# Patient Record
Sex: MALE | Race: WHITE
[De-identification: names, ages, dates, MRNs, and addresses within clinical notes are randomized per-mention and may not be internally consistent; named-entity substitution may affect disease eponyms.]

---

## 2017-01-13 ENCOUNTER — HOSPITAL ENCOUNTER (OUTPATIENT)
Dept: HOSPITAL 47 - LABPAT | Age: 72
Discharge: HOME | End: 2017-01-13
Payer: MEDICARE

## 2017-01-13 ENCOUNTER — HOSPITAL ENCOUNTER (OUTPATIENT)
Dept: HOSPITAL 47 - LABWHC1 | Age: 72
Discharge: HOME | End: 2017-01-13
Payer: MEDICARE

## 2017-01-13 DIAGNOSIS — E11.9: ICD-10-CM

## 2017-01-13 DIAGNOSIS — E11.65: Primary | ICD-10-CM

## 2017-01-13 DIAGNOSIS — R35.0: ICD-10-CM

## 2017-01-13 DIAGNOSIS — I10: ICD-10-CM

## 2017-01-13 DIAGNOSIS — N13.8: ICD-10-CM

## 2017-01-13 DIAGNOSIS — N40.3: ICD-10-CM

## 2017-01-13 DIAGNOSIS — Z01.810: Primary | ICD-10-CM

## 2017-01-13 LAB
ALP SERPL-CCNC: 66 U/L (ref 38–126)
ALT SERPL-CCNC: 48 U/L (ref 21–72)
ANION GAP SERPL CALC-SCNC: 15 MMOL/L
AST SERPL-CCNC: 37 U/L (ref 17–59)
BASOPHILS # BLD AUTO: 0 K/UL (ref 0–0.2)
BASOPHILS NFR BLD AUTO: 0 %
BUN SERPL-SCNC: 25 MG/DL (ref 9–20)
CALCIUM SPEC-MCNC: 9.6 MG/DL (ref 8.4–10.2)
CH: 29.3
CHCM: 33.5
CHLORIDE SERPL-SCNC: 97 MMOL/L (ref 98–107)
CO2 SERPL-SCNC: 25 MMOL/L (ref 22–30)
EOSINOPHIL # BLD AUTO: 0.1 K/UL (ref 0–0.7)
EOSINOPHIL NFR BLD AUTO: 2 %
ERYTHROCYTE [DISTWIDTH] IN BLOOD BY AUTOMATED COUNT: 4.03 M/UL (ref 4.3–5.9)
ERYTHROCYTE [DISTWIDTH] IN BLOOD: 13.5 % (ref 11.5–15.5)
GLUCOSE SERPL-MCNC: 197 MG/DL (ref 74–99)
HCT VFR BLD AUTO: 35.5 % (ref 39–53)
HDW: 2.96
HGB BLD-MCNC: 11.8 GM/DL (ref 13–17.5)
LUC NFR BLD AUTO: 3 %
LYMPHOCYTES # SPEC AUTO: 0.9 K/UL (ref 1–4.8)
LYMPHOCYTES NFR SPEC AUTO: 14 %
MCH RBC QN AUTO: 29.3 PG (ref 25–35)
MCHC RBC AUTO-ENTMCNC: 33.3 G/DL (ref 31–37)
MCV RBC AUTO: 88 FL (ref 80–100)
MONOCYTES # BLD AUTO: 0.3 K/UL (ref 0–1)
MONOCYTES NFR BLD AUTO: 4 %
NEUTROPHILS # BLD AUTO: 4.6 K/UL (ref 1.3–7.7)
NEUTROPHILS NFR BLD AUTO: 77 %
NON-AFRICAN AMERICAN GFR(MDRD): >60
POTASSIUM SERPL-SCNC: 4.5 MMOL/L (ref 3.5–5.1)
PROT SERPL-MCNC: 6.9 G/DL (ref 6.3–8.2)
SODIUM SERPL-SCNC: 137 MMOL/L (ref 137–145)
WBC # BLD AUTO: 0.16 10*3/UL
WBC # BLD AUTO: 5.9 K/UL (ref 3.8–10.6)
WBC (PEROX): 6.42

## 2017-01-13 PROCEDURE — 84443 ASSAY THYROID STIM HORMONE: CPT

## 2017-01-13 PROCEDURE — 80053 COMPREHEN METABOLIC PANEL: CPT

## 2017-01-13 PROCEDURE — 93005 ELECTROCARDIOGRAM TRACING: CPT

## 2017-01-13 PROCEDURE — 87086 URINE CULTURE/COLONY COUNT: CPT

## 2017-01-13 PROCEDURE — 87186 SC STD MICRODIL/AGAR DIL: CPT

## 2017-01-13 PROCEDURE — 85025 COMPLETE CBC W/AUTO DIFF WBC: CPT

## 2017-01-13 PROCEDURE — 36415 COLL VENOUS BLD VENIPUNCTURE: CPT

## 2017-01-13 PROCEDURE — 84439 ASSAY OF FREE THYROXINE: CPT

## 2017-01-13 PROCEDURE — 87077 CULTURE AEROBIC IDENTIFY: CPT

## 2017-01-19 ENCOUNTER — HOSPITAL ENCOUNTER (OUTPATIENT)
Dept: HOSPITAL 47 - OR | Age: 72
LOS: 1 days | Discharge: HOME | End: 2017-01-20
Payer: MEDICARE

## 2017-01-19 VITALS — BODY MASS INDEX: 35.2 KG/M2

## 2017-01-19 DIAGNOSIS — I25.2: ICD-10-CM

## 2017-01-19 DIAGNOSIS — Z82.49: ICD-10-CM

## 2017-01-19 DIAGNOSIS — E78.00: ICD-10-CM

## 2017-01-19 DIAGNOSIS — R53.1: ICD-10-CM

## 2017-01-19 DIAGNOSIS — Z88.8: ICD-10-CM

## 2017-01-19 DIAGNOSIS — N13.2: ICD-10-CM

## 2017-01-19 DIAGNOSIS — S81.801S: ICD-10-CM

## 2017-01-19 DIAGNOSIS — Z87.891: ICD-10-CM

## 2017-01-19 DIAGNOSIS — Z83.3: ICD-10-CM

## 2017-01-19 DIAGNOSIS — Z88.1: ICD-10-CM

## 2017-01-19 DIAGNOSIS — Z79.4: ICD-10-CM

## 2017-01-19 DIAGNOSIS — K21.9: ICD-10-CM

## 2017-01-19 DIAGNOSIS — Z79.84: ICD-10-CM

## 2017-01-19 DIAGNOSIS — E11.9: ICD-10-CM

## 2017-01-19 DIAGNOSIS — I11.0: ICD-10-CM

## 2017-01-19 DIAGNOSIS — Z79.899: ICD-10-CM

## 2017-01-19 DIAGNOSIS — R33.8: ICD-10-CM

## 2017-01-19 DIAGNOSIS — Z79.82: ICD-10-CM

## 2017-01-19 DIAGNOSIS — M19.90: ICD-10-CM

## 2017-01-19 DIAGNOSIS — Z88.5: ICD-10-CM

## 2017-01-19 DIAGNOSIS — C61: Primary | ICD-10-CM

## 2017-01-19 DIAGNOSIS — Z86.73: ICD-10-CM

## 2017-01-19 DIAGNOSIS — I50.9: ICD-10-CM

## 2017-01-19 DIAGNOSIS — G47.33: ICD-10-CM

## 2017-01-19 LAB
GLUCOSE BLD-MCNC: 175 MG/DL (ref 75–99)
GLUCOSE BLD-MCNC: 183 MG/DL (ref 75–99)
GLUCOSE BLD-MCNC: 188 MG/DL (ref 75–99)
GLUCOSE BLD-MCNC: 194 MG/DL (ref 75–99)
GLUCOSE BLD-MCNC: 255 MG/DL (ref 75–99)
HEMOGLOBIN A1C: 6.6 % (ref 4.2–6.1)

## 2017-01-19 PROCEDURE — 88305 TISSUE EXAM BY PATHOLOGIST: CPT

## 2017-01-19 PROCEDURE — 83036 HEMOGLOBIN GLYCOSYLATED A1C: CPT

## 2017-01-19 PROCEDURE — 52601 PROSTATECTOMY (TURP): CPT

## 2017-01-19 PROCEDURE — 88341 IMHCHEM/IMCYTCHM EA ADD ANTB: CPT

## 2017-01-19 PROCEDURE — 88342 IMHCHEM/IMCYTCHM 1ST ANTB: CPT

## 2017-01-19 RX ADMIN — DOCUSATE SODIUM SCH MG: 100 CAPSULE, LIQUID FILLED ORAL at 22:10

## 2017-01-19 RX ADMIN — CEFAZOLIN SCH MLS/HR: 330 INJECTION, POWDER, FOR SOLUTION INTRAMUSCULAR; INTRAVENOUS at 19:23

## 2017-01-19 RX ADMIN — INSULIN LISPRO SCH UNIT: 100 INJECTION, SOLUTION INTRAVENOUS; SUBCUTANEOUS at 17:53

## 2017-01-19 RX ADMIN — CEFAZOLIN SCH MLS: 330 INJECTION, POWDER, FOR SOLUTION INTRAMUSCULAR; INTRAVENOUS at 10:00

## 2017-01-19 RX ADMIN — OXYBUTYNIN CHLORIDE SCH MG: 5 TABLET ORAL at 22:10

## 2017-01-19 RX ADMIN — CEFAZOLIN SCH MLS: 330 INJECTION, POWDER, FOR SOLUTION INTRAMUSCULAR; INTRAVENOUS at 11:27

## 2017-01-19 RX ADMIN — INSULIN LISPRO SCH UNIT: 100 INJECTION, SOLUTION INTRAVENOUS; SUBCUTANEOUS at 13:19

## 2017-01-19 RX ADMIN — CEFAZOLIN SCH MLS/HR: 330 INJECTION, POWDER, FOR SOLUTION INTRAMUSCULAR; INTRAVENOUS at 14:47

## 2017-01-19 NOTE — P.OP
Date of Procedure: 01/19/17


Preoperative Diagnosis: 


BPH with Obstruction, Urinary Retention


Postoperative Diagnosis: 


Same


Procedure(s) Performed: 


Cystoscopy, Bipolar Transurethral Resection of Prostate (TURP)


Anesthesia: spinal


Surgeon: Marvin Cuevas


Estimated Blood Loss (ml): 50


IV fluids (ml): 950


Pathology: other (prostate chips)


Condition: stable


Disposition: PACU


Indications for Procedure: 


He is a 71 year-old male with progressive urge incontinence despite taking 

Hytrin and oxybutynin chloride. Cystoscopy revealed a high median prostatic bar

, and urodynamic studies show excellent bladder function with detrusor 

pressures of 92 cm H20. He will thus undergo a TURP, as he has failed medical 

therapy.


Operative Findings: 


Complete prostatic obstruction with bilobar configuration and high median bar.


Description of Procedure: 


The patient was taken in the operating room and placed in the dorsolithotomy 

position after being given a spinal anesthetic,  The external genitalia was 

prepped and draped sterilely.  The 25-Mohawk ACMI resectoscope sheath was 

introduced into the bladder.  The bladder was inspected.  Both ureteral 

orifices were of normal anatomic location and configuration, and clear urine 

effluxed from both.  No tumors or foreign bodies were seen.  There was evidence 

of catheter cystitis.  Examination of the prostate revealed complete 

obstruction with a bilobar configuration and a high median bar.  Using the 

bipolar cutting loop, the prostatic floor was resected from the vesical neck up 

to the verumontanum.  the lateral lobes were resected down to the surgical 

capsule.  The resection of the floor floor of the prostate was then completed, 

proximal to the verumontanum.  Lastly, any remaining anterior tissue was 

resected.  Some remaining apical tissue was carefully resected, though this was 

likely incomplete given that the apex extended well beyond the verumontanum and 

care was taken not to extend the resection all the way to the external urinary 

sphincter.  The resection was carried down to the surgical capsule in all 4 

quadrants.   The prostatic fossa was then carefully examined, and any areas of 

bleeding were controlled with electrocautery.  Excellent hemostasis was 

attained.  The resectoscope was withdrawn into the bulbous urethra.  The 

external urinary sphincter remained intact.  The prostatic fossa was open.  The 

Shopalytic evacuator was used to remove all prostate chips from the bladder.  These 

were saved and sent for pathologic examination.  The resectoscope was removed, 

and a 22 [place]awk, 3-Way Granados catheter was placed.  The return was initially 

clear, but became bloody and thus continuous bladder irrigation was started 

using 0.9 normal saline.   The return was essentially clear.  The patient 

tolerated the procedure well was taken to the recovery room in stable condition.

## 2017-01-20 VITALS — RESPIRATION RATE: 16 BRPM

## 2017-01-20 VITALS — TEMPERATURE: 98.6 F | SYSTOLIC BLOOD PRESSURE: 147 MMHG | HEART RATE: 75 BPM | DIASTOLIC BLOOD PRESSURE: 65 MMHG

## 2017-01-20 LAB
GLUCOSE BLD-MCNC: 116 MG/DL (ref 75–99)
GLUCOSE BLD-MCNC: 183 MG/DL (ref 75–99)

## 2017-01-20 RX ADMIN — DOCUSATE SODIUM SCH MG: 100 CAPSULE, LIQUID FILLED ORAL at 08:20

## 2017-01-20 RX ADMIN — INSULIN LISPRO SCH UNIT: 100 INJECTION, SOLUTION INTRAVENOUS; SUBCUTANEOUS at 12:32

## 2017-01-20 RX ADMIN — OXYBUTYNIN CHLORIDE SCH MG: 5 TABLET ORAL at 08:20

## 2017-01-20 RX ADMIN — INSULIN LISPRO SCH: 100 INJECTION, SOLUTION INTRAVENOUS; SUBCUTANEOUS at 09:04

## 2017-01-20 RX ADMIN — INSULIN LISPRO SCH UNIT: 100 INJECTION, SOLUTION INTRAVENOUS; SUBCUTANEOUS at 08:20

## 2017-01-20 NOTE — P.DS
Providers


Expected date of discharge: 01/20/17


Attending physician: 


Marvin Cuevas





Primary care physician: 


Abbi Stahl





Hospital Course: 





On the day of admission, the patient underwent an uncomplicated TURP.  The 

postoperative course was unremarkable.  He remained afebrile with stable vital 

signs.  On the first postoperative day, the Granados catheter was draining clear 

yellow urine.


Procedures: 





Cystoscopy, bipolar transurethral resection of prostate in 01/19/2017.


Patient Condition at Discharge: Good





Plan - Discharge Summary


Discharge Medication List





Furosemide [Lasix] 40 mg PO DAILY 10/05/16 [History]


Insulin Aspart [NovoLOG Flexpen] 14 unit SQ AC-BID 10/05/16 [History]


Insulin Aspart [NovoLOG Flexpen] 16 unit SQ AC-SUPPER 10/05/16 [History]


Insulin Glargine [Lantus] 34 unit SQ QAM 10/05/16 [History]


Insulin Glargine [Lantus] 56 unit SQ HS 10/05/16 [History]


Multivitamins, Thera [Multivitamin] 1 tab PO DAILY 10/05/16 [History]


RX: Aspirin [Adult Low Dose Aspirin EC] 81 mg PO DAILY 10/05/16 [History]


RX: Lisinopril [Zestril] 2.5 mg PO DAILY 10/05/16 [History]


RX: Metolazone [Zaroxolyn] 5 mg PO TUSA 10/05/16 [History]


RX: Omeprazole 20 mg PO DAILY 10/05/16 [History]


RX: Oxybutynin Chloride [Ditropan] 5 mg PO TID 10/05/16 [History]


RX: Simvastatin [Zocor] 40 mg PO HS 10/05/16 [History]


RX: metFORMIN HCL 1,000 mg PO BID 10/05/16 [History]


Ascorbic Acid [Vitamin C] 500 mg PO DAILY 01/12/17 [History]


Ferrous Sulfate [Feosol] 325 mg PO DAILY 01/12/17 [History]








Follow up Appointment(s)/Referral(s): 


Marvin Cuevas MD [STAFF PHYSICIAN] - 1 Week


Activity/Diet/Wound Care/Special Instructions: 


Discharge home with Granados catheter.  Resume preoperative home medications.  My 

office will contact patient regarding follow-up.


Discharge Disposition: HOME SELF-CARE

## 2017-02-07 ENCOUNTER — HOSPITAL ENCOUNTER (OUTPATIENT)
Dept: HOSPITAL 47 - RADCTMAIN | Age: 72
Discharge: HOME | End: 2017-02-07
Payer: MEDICARE

## 2017-02-07 DIAGNOSIS — K80.20: ICD-10-CM

## 2017-02-07 DIAGNOSIS — N13.30: ICD-10-CM

## 2017-02-07 DIAGNOSIS — R59.0: ICD-10-CM

## 2017-02-07 DIAGNOSIS — N32.89: ICD-10-CM

## 2017-02-07 DIAGNOSIS — C61: Primary | ICD-10-CM

## 2017-02-07 LAB
BUN SERPL-SCNC: 32 MG/DL (ref 9–20)
NON-AFRICAN AMERICAN GFR(MDRD): >60

## 2017-02-07 PROCEDURE — 82565 ASSAY OF CREATININE: CPT

## 2017-02-07 PROCEDURE — 78306 BONE IMAGING WHOLE BODY: CPT

## 2017-02-07 PROCEDURE — 84520 ASSAY OF UREA NITROGEN: CPT

## 2017-02-07 PROCEDURE — 74177 CT ABD & PELVIS W/CONTRAST: CPT

## 2017-02-07 NOTE — NM
EXAMINATION TYPE: NM bone scan whole body

 

DATE OF EXAM: 2/7/2017 2:05 PM

 

COMPARISON: CT abdomen and pelvis earlier today.

 

HISTORY: Prostate cancer

 

Delayed whole-body scanning was performed following the injection of 27.5 mCi Tc 99m MDP.  Images acq
uired 5 hours post injection. Whole body images are acquired as well as images of the thorax abdomen 
and pelvis are acquired in multiple projections

 

FINDINGS: Small area of increased uptake anterolateral left lower rib corresponds to subtle sclerotic
 area on CT could reflect early metastatic disease versus healing fracture. Faint area of increased u
ptake same level anterolateral right rib is noted without definitive corresponding CT abnormality. Si
milar area of abnormal uptake left sternoclavicular joint is noted.

 

IMPRESSION: As above, 2-3 areas of nonspecific increased uptake, osseous metastatic disease felt unli
brad but not excluded entirely excluded. Consider short-term whole body bone scan follow-up in 3-6 mo
nths time.

## 2017-02-07 NOTE — CT
EXAMINATION TYPE: CT abdomen pelvis w con

 

DATE OF EXAM: 2/7/2017 9:07 AM

 

COMPARISON: NONE

 

HISTORY: 71 year-old male history of Prostate cancer

 

TECHNIQUE: Contiguous axial scanning of the abdomen and pelvis following administration of 100 ml Omn
ipaque 300 IV contrast.  Delayed images through the kidneys and coronal/sagittal reconstructions perf
ormed.

 

CT DLP: 2035 mGycm

Automated exposure control for dose reduction was used.

 

 

FINDINGS: 

Heart is borderline to mildly enlarged without pericardial effusion. There is hazy and strandy atelec
tasis in both lower lungs without pleural effusion.

 

Small hiatal hernia.

 

No focal liver lesion or biliary ductal dilatation. Portal venous system is patent.

 

There is cholelithiasis with a dominant 2.4 cm gallstone. No abnormal gallbladder distention.

 

Adrenal glands, and pancreas show no gross abnormality.

 

The spleen is mildly enlarged at 15.6 cm.

 

Bilateral hypodense lesions within the kidneys. These measure up to 2.5 cm and are suggestive of cyst
s.  There is mild bilateral hydronephrosis and mild hydroureter on both sides.

 

Scattered nonenlarged mesenteric lymph nodes are present. However, there is retroperitoneal lymphaden
opathy measuring 1.4 cm in the retrocaval region at the level of the kidneys, 1.1 cm at the level of 
the aortic bifurcation on the right, 1.0 cm on the right common iliac chain, 1.6 cm along the proxima
l right external iliac chain, and 2.2 cm on the right obturator chain.

 

No dilated small bowel, free fluid, or free air. Normal appendix. Oral contrast has progressed to the
 hepatic flexure. There is moderate stool burden without pericolonic inflammatory change.

 

There is moderate circumferential bladder wall thickening with mild perivesical fat stranding. Query 
prior prostate surgery or TURP. No abnormal fluid collection in the pelvis.

 

Bones: Large heterotopic ossifications within the left greater than right gluteal subcutaneous fat. D
egenerative changes mid to lower lumbar spine. No osseous destructive process. No suspicious scleroti
c lesion seen.

 

 

IMPRESSION: 

 

1. QUESTION PRIOR PROSTATE SURGERY OR TURP.

2. PERICAVAL LYMPHADENOPATHY IN THE RETROPERITONEUM MEASURING UP TO 1.4 CM AND ALONG THE RIGHT EXTERN
AL ILIAC (1.6 CM) AND OBTURATOR CHAINS (2.2 CM). FINDINGS SUSPICIOUS FOR METASTATIC LYMPHADENOPATHY.

3. MODERATE CIRCUMFERENTIAL BLADDER WALL THICKENING MAY REFLECT CHRONIC BLADDER WALL HYPERTROPHY. HOW
EVER, GIVEN THE SURROUNDING FAT STRANDING; CORRELATE TO EXCLUDE CYSTITIS.

4. THERE IS MILD BILATERAL HYDRONEPHROSIS AND HYDROURETER.

5. CHOLELITHIASIS

## 2020-06-09 ENCOUNTER — HOSPITAL ENCOUNTER (OUTPATIENT)
Dept: HOSPITAL 47 - RADNMMAIN | Age: 75
Discharge: HOME | End: 2020-06-09
Attending: UROLOGY
Payer: MEDICARE

## 2020-06-09 DIAGNOSIS — C79.51: Primary | ICD-10-CM

## 2020-06-09 DIAGNOSIS — C61: ICD-10-CM

## 2020-06-09 DIAGNOSIS — Z88.1: ICD-10-CM

## 2020-06-09 DIAGNOSIS — Z88.5: ICD-10-CM

## 2020-06-09 PROCEDURE — 78306 BONE IMAGING WHOLE BODY: CPT

## 2020-06-09 NOTE — NM
EXAMINATION TYPE: NM bone scan whole body

 

DATE OF EXAM: 6/9/2020

 

COMPARISON: 2/7/2017

 

HISTORY: Prostate cancer. Follow-up exam.

 

Delayed whole-body scanning was performed following the injection of 25.1 mCi Tc 99m MDP.  Images acq
uired 3 hours post injection.

 

FINDINGS: 

Increase in osseous metastasis is demonstrated in comparison to the prior of 2/7/2017. Metastasis is 
now multifocal involving the cranium, maxilla, mandible, left shoulder, sternum, right clavicle, nume
emma ribs, thoracic spine, lumbar spine, cervical spine, pelvis, right femur, left sacroiliac joint a
nd questionably left acetabulum.

 

IMPRESSION: Progression of disease. Diffuse multifocal osseous metastasis of both the axial and appen
dicular skeleton as discussed above.

## 2020-07-24 ENCOUNTER — HOSPITAL ENCOUNTER (OUTPATIENT)
Dept: HOSPITAL 47 - RADCTMAIN | Age: 75
Discharge: HOME | End: 2020-07-24
Attending: INTERNAL MEDICINE
Payer: MEDICARE

## 2020-07-24 DIAGNOSIS — Z88.8: ICD-10-CM

## 2020-07-24 DIAGNOSIS — C61: ICD-10-CM

## 2020-07-24 DIAGNOSIS — Z88.5: ICD-10-CM

## 2020-07-24 DIAGNOSIS — N13.4: ICD-10-CM

## 2020-07-24 DIAGNOSIS — C79.51: Primary | ICD-10-CM

## 2020-07-24 DIAGNOSIS — N13.30: ICD-10-CM

## 2020-07-24 LAB — BUN SERPL-SCNC: 33 MG/DL (ref 9–20)

## 2020-07-24 PROCEDURE — 84520 ASSAY OF UREA NITROGEN: CPT

## 2020-07-24 PROCEDURE — 74177 CT ABD & PELVIS W/CONTRAST: CPT

## 2020-07-24 PROCEDURE — 71260 CT THORAX DX C+: CPT

## 2020-07-24 PROCEDURE — 82565 ASSAY OF CREATININE: CPT

## 2020-07-24 PROCEDURE — 36415 COLL VENOUS BLD VENIPUNCTURE: CPT

## 2020-07-24 NOTE — CT
EXAMINATION TYPE: CT ChestAbdPelvis w con

 

DATE OF EXAM: 7/24/2020

 

COMPARISON: CT abdomen pelvis 2/7/2017. Nuclear medicine bone scan 6/9/2020.

 

HISTORY: Prostate cancer, obs. suspected METS

 

CT DLP: 2971.1 mGycm

Automated exposure control for dose reduction was used.

 

CONTRAST: 

CT scan of the chest, abdomen and pelvis is performed with Oral Contrast and with IV Contrast, patien
t injected with 80 mL of Isovue 300.

 

FINDINGS:

 

LUNGS: Bilateral atelectasis. There is no concerning lung parenchymal mass or nodule identified.   Th
ere is no pleural effusion or pneumothorax seen.  The tracheobronchial tree is patent.

 

MEDIASTINUM: There are no greater than 1 cm hilar or mediastinal lymph nodes. Cardiomegaly. Calcified
 coronary artery disease. No pericardial effusion is seen.  

 

OTHER:  Left-sided gynecomastia.

 

LIVER/GB: No liver lesion. Cholelithiasis. No biliary ductal dilatation.

 

PANCREAS: No significant abnormality is seen.

 

SPLEEN: No significant abnormality is seen.

 

ADRENALS: No significant abnormality is seen.

 

KIDNEYS: Bilateral renal cysts. There is mild left hydronephrosis and moderate diffuse left hydrouret
er. There is mild hydroureter on the right of the mid to distal ureter.

 

BOWEL:  No evidence of obstruction.

 

PERITONEUM:  No free air is visualized. No free fluid. Mild periureteral haziness of the mid ureters.


 

ADENOPATHY:  Multiple new enlarged retroperitoneal lymph nodes, the largest of which measures up to 1
.4 cm in shortest axial dimension periaortic on the left (3:79).

 

PELVIS: There is increased/new 2.9 x 4.9 cm lobulated exophytic bladder mass of the base of the urina
ry bladder, with decreased soft tissue in the region of the prostate versus 2017.

 

VASCULATURE:  No abdominal aortic aneurysm. Moderate calcified atherosclerotic disease.

 

MUSCULOSKELETAL:  Incompletely visualized right hip femoral nail incompletely visualized distal fract
ure deformity. Multifocal new osteoblastic metastases primarily of the pelvis and spine. There is ost
eoporosis. Moderate compression deformity of the superior endplate of L3 with retropulsion and likely
 moderate narrowing of the canal spinal canal. Multilevel degenerative changes of the spine.

 

IMPRESSION: 

1.  Exophytic mass at the base of the urinary bladder measuring 2.9 x 4.9 cm, with obstructive bilate
ral hydroureter and mild left hydronephrosis. Findings may represent recurrent prostate cancer invadi
ng the urinary bladder versus urothelial primary. Appearance of the prostate gland may be postsurgica
l or posttreatment related.

2.  New retroperitoneal lymphadenopathy versus 2017 comparison.

3.  New multifocal sclerotic osseous metastases, corresponding with 6/9/2020 bone scan comparison.

4.  There is an age-indeterminate moderate L3 vertebral body compression deformity, with retropulsion
 and moderate narrowing of the spinal canal. Recommend clinical correlation for acute lower back pain
. Findings may be related to osteoporosis more likely than pathological fracture.